# Patient Record
Sex: FEMALE | Race: WHITE | NOT HISPANIC OR LATINO | Employment: UNEMPLOYED | URBAN - METROPOLITAN AREA
[De-identification: names, ages, dates, MRNs, and addresses within clinical notes are randomized per-mention and may not be internally consistent; named-entity substitution may affect disease eponyms.]

---

## 2023-12-11 ENCOUNTER — OFFICE VISIT (OUTPATIENT)
Dept: DERMATOLOGY | Facility: CLINIC | Age: 43
End: 2023-12-11
Payer: COMMERCIAL

## 2023-12-11 DIAGNOSIS — D22.9 MULTIPLE BENIGN NEVI: Primary | ICD-10-CM

## 2023-12-11 PROCEDURE — 99213 OFFICE O/P EST LOW 20 MIN: CPT | Performed by: STUDENT IN AN ORGANIZED HEALTH CARE EDUCATION/TRAINING PROGRAM

## 2023-12-11 RX ORDER — GLUCOSAM/CHONDRO/HERB 149/HYAL 750-100 MG
TABLET ORAL
COMMUNITY

## 2023-12-11 RX ORDER — VIT C/E/ZN/COPPR/LUTEIN/ZEAXAN 250MG-90MG
CAPSULE ORAL
COMMUNITY

## 2023-12-11 NOTE — PROGRESS NOTES
Subjective     Maria De Jesus Merino is a 43 y.o. female who presents for the following: Skin Check (Hx of skin cancer in Family, but no personal Hx.).     Review of Systems:  No other skin or systemic complaints other than what is documented elsewhere in the note.    The following portions of the chart were reviewed this encounter and updated as appropriate:          Skin Cancer History  No skin cancer on file.      Specialty Problems    None       Objective   Well appearing patient in no apparent distress; mood and affect are within normal limits.    A full skin examination was performed. All findings within normal limits unless otherwise noted below.    Assessment/Plan   1. Multiple benign nevi    Exam findings: Benign appearing macules and papules  Plan: monitor for any new or changing nevi. Notify me should this occur.  Over the counter use of sun screen product (30+ SPF with mineral sun screen) recommended  Dermatoscopic exam unremarkable of several nevi including left abdomen  Photo taken of left abdomen for closer monitoring reference for next year      I was present during all key portions of visit including history, exam, discussion/plan and/or procedures and directly supervised our resident during all portions of the visit, follow up care, medications and more    Avtar Horowitz MD

## 2024-02-21 ENCOUNTER — TELEPHONE (OUTPATIENT)
Dept: DERMATOLOGY | Facility: CLINIC | Age: 44
End: 2024-02-21
Payer: COMMERCIAL

## 2024-02-21 NOTE — TELEPHONE ENCOUNTER
Pt needs appt with Dr Horowitz for suspicious lesion on face that she noticed afted her Dec appt ..

## 2024-03-04 ENCOUNTER — OFFICE VISIT (OUTPATIENT)
Dept: DERMATOLOGY | Facility: CLINIC | Age: 44
End: 2024-03-04
Payer: COMMERCIAL

## 2024-03-04 DIAGNOSIS — D48.5 NEOPLASM OF UNCERTAIN BEHAVIOR OF SKIN: Primary | ICD-10-CM

## 2024-03-04 PROCEDURE — 11102 TANGNTL BX SKIN SINGLE LES: CPT | Performed by: STUDENT IN AN ORGANIZED HEALTH CARE EDUCATION/TRAINING PROGRAM

## 2024-03-04 PROCEDURE — 88305 TISSUE EXAM BY PATHOLOGIST: CPT | Performed by: DERMATOLOGY

## 2024-03-04 ASSESSMENT — DERMATOLOGY PATIENT ASSESSMENT
DO YOU USE SUNSCREEN: OCCASIONALLY
FOR PATIENTS COMING IN FOR A FOLLOW-UP VISIT - HAVE THERE BEEN ANY CHANGES IN YOUR HEALTH SINCE YOUR LAST VISIT: SPOT ON NOSE
HAVE YOU HAD OR DO YOU HAVE A STAPH INFECTION: NO
HAVE YOU HAD OR DO YOU HAVE VASCULAR DISEASE: NO
DO YOU USE A TANNING BED: NO
DO YOU HAVE IRREGULAR MENSTRUAL CYCLES: NO
ARE YOU AN ORGAN TRANSPLANT RECIPIENT: NO
DO YOU HAVE ANY NEW OR CHANGING LESIONS: YES
ARE YOU TRYING TO GET PREGNANT: NO
ARE YOU ON BIRTH CONTROL: NO

## 2024-03-04 ASSESSMENT — DERMATOLOGY QUALITY OF LIFE (QOL) ASSESSMENT
RATE HOW EMOTIONALLY BOTHERED YOU ARE BY YOUR SKIN PROBLEM (FOR EXAMPLE, WORRY, EMBARRASSMENT, FRUSTRATION): 0 - NEVER BOTHERED
WHAT SINGLE SKIN CONDITION LISTED BELOW IS THE PATIENT ANSWERING THE QUALITY-OF-LIFE ASSESSMENT QUESTIONS ABOUT: NONE OF THE ABOVE
RATE HOW BOTHERED YOU ARE BY EFFECTS OF YOUR SKIN PROBLEMS ON YOUR ACTIVITIES (EG, GOING OUT, ACCOMPLISHING WHAT YOU WANT, WORK ACTIVITIES OR YOUR RELATIONSHIPS WITH OTHERS): 0 - NEVER BOTHERED
DATE THE QUALITY-OF-LIFE ASSESSMENT WAS COMPLETED: 66903
RATE HOW BOTHERED YOU ARE BY SYMPTOMS OF YOUR SKIN PROBLEM (EG, ITCHING, STINGING BURNING, HURTING OR SKIN IRRITATION): 0 - NEVER BOTHERED
ARE THERE EXCLUSIONS OR EXCEPTIONS FOR THE QUALITY OF LIFE ASSESSMENT: NO

## 2024-03-04 ASSESSMENT — ITCH NUMERIC RATING SCALE: HOW SEVERE IS YOUR ITCHING?: 0

## 2024-03-04 ASSESSMENT — PATIENT GLOBAL ASSESSMENT (PGA): PATIENT GLOBAL ASSESSMENT: PATIENT GLOBAL ASSESSMENT:  1 - CLEAR

## 2024-03-04 NOTE — PROGRESS NOTES
Subjective     Maria De Jesus Merino is a 43 y.o. female who presents for the following: Suspicious Skin Lesion (Left side of nose. No itchiness or pain. No personal hx of skin cancer. Family hx of melanoma (sister)).     Review of Systems:  No other skin or systemic complaints other than what is documented elsewhere in the note.    The following portions of the chart were reviewed this encounter and updated as appropriate:          Skin Cancer History  No skin cancer on file.      Specialty Problems    None       Objective   Well appearing patient in no apparent distress; mood and affect are within normal limits.    A focused skin examination was performed. All findings within normal limits unless otherwise noted below.    Assessment/Plan   1. Neoplasm of uncertain behavior of skin  Left Nasal Sidewall  Pink papule              Lesion biopsy  Type of biopsy: tangential    Informed consent: discussed and consent obtained    Timeout: patient name, date of birth, surgical site, and procedure verified    Procedure prep:  Patient was prepped and draped  Anesthesia: the lesion was anesthetized in a standard fashion    Anesthetic:  1% lidocaine w/ epinephrine 1-100,000 local infiltration  Instrument used: DermaBlade    Hemostasis achieved with: aluminum chloride    Outcome: patient tolerated procedure well    Post-procedure details: sterile dressing applied and wound care instructions given    Dressing type: petrolatum and bandage      Specimen 1 - Dermatopathology- DERM LAB  Differential Diagnosis: bcc?  Check Margins Yes/No?:    Comments:    Dermpath Lab: Routine Histopathology (formalin-fixed tissue)

## 2024-03-06 LAB
LABORATORY COMMENT REPORT: NORMAL
PATH REPORT.FINAL DX SPEC: NORMAL
PATH REPORT.GROSS SPEC: NORMAL
PATH REPORT.MICROSCOPIC SPEC OTHER STN: NORMAL
PATH REPORT.RELEVANT HX SPEC: NORMAL
PATH REPORT.TOTAL CANCER: NORMAL

## 2024-03-11 DIAGNOSIS — C44.311 BASAL CELL CARCINOMA (BCC) OF SKIN OF NOSE: ICD-10-CM

## 2024-03-19 ENCOUNTER — OFFICE VISIT (OUTPATIENT)
Dept: DERMATOLOGY | Facility: CLINIC | Age: 44
End: 2024-03-19
Payer: COMMERCIAL

## 2024-03-19 DIAGNOSIS — C44.311 BASAL CELL CARCINOMA (BCC) OF LEFT SIDE OF NOSE: Primary | ICD-10-CM

## 2024-03-19 PROCEDURE — 17311 MOHS 1 STAGE H/N/HF/G: CPT | Performed by: DERMATOLOGY

## 2024-03-19 PROCEDURE — 17312 MOHS ADDL STAGE: CPT | Performed by: DERMATOLOGY

## 2024-03-19 PROCEDURE — 13151 CMPLX RPR E/N/E/L 1.1-2.5 CM: CPT | Performed by: DERMATOLOGY

## 2024-03-19 PROCEDURE — 99214 OFFICE O/P EST MOD 30 MIN: CPT | Performed by: DERMATOLOGY

## 2024-03-19 NOTE — PROGRESS NOTES
Office Visit Note  Date: 3/19/2024  Surgeon:  Mackenzie Anne MD  Office Location: DO [facility]  [address]  [address]  [facility] [address]  Dept: [phone]  Dept Fax: [phone]  Referring Provider: Avtar Horowitz MD  [id number]  Department of Dermatology  [address]   Maria De Jesus Merino is a 43 y.o. female who presents for the following: MOHS Surgery    According to the patient, the lesion has been present for approximately 1 month at the time of diagnosis.  The lesion is not causing symptoms.  The lesion has not been treated previously.    The patient does not have a pacemaker / defibrillator.  The patient does not have a heart valve / joint replacement.    The patient is not on blood thinners.  The patient does not have a history of hepatitis B or C.  The patient does not have a history of HIV.  The patient does not have a history of immunosuppression (e.g. organ transplantation, malignancy, medications)    Review of Systems:  No other skin or systemic complaints other than what is documented elsewhere in the note.    MEDICAL HISTORY: clinically relevant history including significant past medical history, medications and allergies was reviewed and documented in Epic.    Objective   Well appearing patient in no apparent distress; mood and affect are within normal limits.  Vital signs: See record.  Noted on the Left Malar Cheek  Is a 0.6 x 0.6 cm scar      The patient confirmed the identified site.    Discussion:  The nature of the diagnosis was explained. The lesion is a skin cancer.  It has a risk of local growth and distant spread. The condition is associated with sun exposure.  Warning signs of non-melanoma skin cancer discussed. Patient was instructed to perform monthly self skin examination.  We recommended that the patient have regular full skin exams given an increased risk of subsequent skin cancers. The patient was instructed to use sun  protective behaviors including use of broad spectrum sunscreens and sun protective clothing to reduce risk of skin cancers.      Risks, benefits, side effects of Mohs surgery were discussed with patient and the patient voiced understanding.  It was explained that even though the cure rate of Mohs is very high it is not 100%. Risks of surgery including but not limited to bleeding, infection, numbness, nerve damage, and scar were reviewed.  Discussion included wound care requirements, activity restrictions, likely scar outcome and time to heal.     After Mohs surgery, the defect may need to be repaired surgically and the scar may be longer than the original lesion.  Reconstruction options, risks, and benefits were reviewed including second intention healing, linear repair (4-1 ratio was explained), local flaps, skin grafts, cartilage grafts and interpolation flaps (the need for multiple surgeries was explained). Possible outcomes were reviewed including likely scar appearance, failure of flap survival, infection, bleeding and the need for revision surgery.     The pathology was reviewed, the photograph was reviewed, and the referring physician's note was reviewed.    Patient elected for Mohs surgery.

## 2024-03-19 NOTE — PROGRESS NOTES
Mohs Surgery Operative Note    Date of Surgery:  3/19/2024  Surgeon:  Mackenzie Anne MD  Office Location:  [address] [facility]  [address] [facility] [address]  [facility] [address]  Dept: [phone]  Dept Fax: [phone]  Referring Provider: Avtar Horowitz MD  [id number]  [place] [address]      Assessment/Plan   Pre-procedure:   Obtained informed consent: written from patient  The surgical site was identified and confirmed with the patient.     Intra-operative:   Audible time out called at : 10:05 AM 03/19/24  by: Helen Huerta MA   Verified patient name, birthdate, site, specimen bottle label & requisition.    The planned procedure(s) was again reviewed with the patient. The risks of bleeding, infection, nerve damage and scarring were reviewed. Written authorization was obtained. The patient identity, surgical site, and planned procedure(s) were verified. The provider acted as both surgeon and pathologist.     Basal cell carcinoma (BCC) of left side of nose  Left Nasal Sidewall  Mohs surgery  Consent obtained: written    Universal Protocol:  Procedure explained and questions answered to patient or proxy's satisfaction: Yes    Test results available and properly labeled: Yes    Pathology report reviewed: Yes    External notes reviewed: Yes    Photo or diagram used for site identification: Yes    Site/side marked: Yes    Slide independently reviewed by Mohs surgeon: Yes    Immediately prior to procedure a time out was called: Yes    Patient identity confirmed: verbally with patient  Preparation: Patient was prepped and draped in usual sterile fashion      Anticoagulation:  Is the patient taking prescription anticoagulant and/or aspirin prescribed/recommended by a physician? No    Was the anticoagulation regimen changed prior to Mohs? No      Anesthesia:  Anesthesia method: local infiltration  Local anesthetic: lidocaine 2% WITH epi    Procedure  Details:  Biopsy accession number: [id number]  Date of biopsy: 3/4/2024  Pre-Op diagnosis: basal cell carcinoma  BCC subtype: nodular  Surgery side: left  Surgical site (from skin exam): Left Nasal Sidewall  Pre-operative length (cm): 0.6  Pre-operative width (cm): 0.6  Indications for Mohs surgery: anatomic location where tissue conservation is critical  Previously treated? No      Micrographic Surgery Details:  Post-operative length (cm): 0.7  Post-operative width (cm): 1  Number of Mohs stages: 2    Stage 1  Comments: The patient was brought into the operating room and placed in the procedure chair in the appropriate position.  The area positive by previous biopsy was identified and confirmed with the patient. The area of clinically obvious tumor was debulked using a curette and/or scalpel as needed. An incision was made following the Mohs approach through the skin. The specimen was taken to the lab, divided into 2 piece(s) and appropriately chromacoded and processed.  Tumor was seen on the lateral margins as indicated on the on the Mohs map.  Nodular basal cell carcinoma. Histologic examination revealed large tumor aggregates of atypical basaloid cells with peripheral palisading and tumoral clefting.   Tumor features identified on Mohs section: basal carcinoma   Depth of invasion: dermis    Stage 2     Comments: The area of positivity as noted on the Mohs map in the previous stage was identified and removed using the Mohs technique. The specimen was taken to the lab and appropriately chromacoded and processed in 1 piece(s).  Tumor features identified on Mohs section: no tumor identified  Depth of defect: subcutaneous fat    Patient tolerance of procedure: tolerated well, no immediate complications    Reconstruction:  Was the defect reconstructed? Yes    Was reconstruction performed by the same Mohs surgeon? Yes    Setting of reconstruction: outpatient office  When was reconstruction performed? same day  Type of  reconstruction: linear  Linear reconstruction: complex  Subcutaneous Layers (Deep Stitches)   Suture size:  5-0  Suture type:  Monocryl  Stitches:  Buried vertical mattress  Fine/surface layer approximation (top stitches)   Epidermal/Superficial suture size:  5-0  Epidermal/Superficial suture type:  Fast-absorbing gut  Stitches: simple running    Hemostasis achieved with: electrodesiccation  Outcome: patient tolerated procedure well with no complications    Post-procedure details: sterile dressing applied and wound care instructions given    Dressing type: petrolatum, pressure dressing, Telfa pad and Hypafix      Complex Linear Repair - Wide Undermining:  Given the location and size of the defect, it was determined that a complex layered linear closure was required to restore normal anatomy and function. The repair was considered complex because extensive undermining was required and performed. The amount of undermining performed was greater than the maximum width of the defect as measured perpendicular to the closure line along at least one entire edge of the defect. Standing cutaneous cones were removed using Burow's triangles. The wound edges were brought into close approximation with buried vertical mattress sutures. The remainder of the wound was then closed with epidermal top sutures.    The final repair measured 1.5 cm          Wound care was discussed, and the patient was given written post-operative wound care instructions.      The patient will follow up with Mackenzie Anne MD as needed for any post operative problems or concerns, and will follow up with their primary dermatologist as scheduled.

## 2024-04-03 ENCOUNTER — APPOINTMENT (OUTPATIENT)
Dept: OBSTETRICS AND GYNECOLOGY | Facility: CLINIC | Age: 44
End: 2024-04-03
Payer: COMMERCIAL

## 2024-04-12 ENCOUNTER — APPOINTMENT (OUTPATIENT)
Dept: DERMATOLOGY | Facility: CLINIC | Age: 44
End: 2024-04-12
Payer: COMMERCIAL

## 2024-06-25 ENCOUNTER — APPOINTMENT (OUTPATIENT)
Dept: DERMATOLOGY | Facility: CLINIC | Age: 44
End: 2024-06-25
Payer: COMMERCIAL

## 2024-07-15 ENCOUNTER — OFFICE VISIT (OUTPATIENT)
Dept: SLEEP MEDICINE | Facility: CLINIC | Age: 44
End: 2024-07-15
Payer: COMMERCIAL

## 2024-07-15 VITALS
HEIGHT: 64 IN | HEART RATE: 80 BPM | BODY MASS INDEX: 22.77 KG/M2 | TEMPERATURE: 98.4 F | WEIGHT: 133.4 LBS | SYSTOLIC BLOOD PRESSURE: 121 MMHG | DIASTOLIC BLOOD PRESSURE: 80 MMHG

## 2024-07-15 DIAGNOSIS — G47.00 INSOMNIA, UNSPECIFIED TYPE: ICD-10-CM

## 2024-07-15 DIAGNOSIS — Z72.821 INADEQUATE SLEEP HYGIENE: ICD-10-CM

## 2024-07-15 DIAGNOSIS — G47.33 OSA (OBSTRUCTIVE SLEEP APNEA): Primary | ICD-10-CM

## 2024-07-15 PROCEDURE — G2211 COMPLEX E/M VISIT ADD ON: HCPCS | Performed by: STUDENT IN AN ORGANIZED HEALTH CARE EDUCATION/TRAINING PROGRAM

## 2024-07-15 PROCEDURE — 1036F TOBACCO NON-USER: CPT | Performed by: STUDENT IN AN ORGANIZED HEALTH CARE EDUCATION/TRAINING PROGRAM

## 2024-07-15 PROCEDURE — 99204 OFFICE O/P NEW MOD 45 MIN: CPT | Performed by: STUDENT IN AN ORGANIZED HEALTH CARE EDUCATION/TRAINING PROGRAM

## 2024-07-15 ASSESSMENT — SLEEP AND FATIGUE QUESTIONNAIRES
HOW LIKELY ARE YOU TO NOD OFF OR FALL ASLEEP WHILE LYING DOWN TO REST IN THE AFTERNOON WHEN CIRCUMSTANCES PERMIT: HIGH CHANCE OF DOZING
HOW LIKELY ARE YOU TO NOD OFF OR FALL ASLEEP WHILE SITTING AND TALKING TO SOMEONE: WOULD NEVER DOZE
SITING INACTIVE IN A PUBLIC PLACE LIKE A CLASS ROOM OR A MOVIE THEATER: WOULD NEVER DOZE
ESS-CHAD TOTAL SCORE: 6
DIFFICULTY_STAYING_ASLEEP: MODERATE
HOW LIKELY ARE YOU TO NOD OFF OR FALL ASLEEP WHEN YOU ARE A PASSENGER IN A CAR FOR AN HOUR WITHOUT A BREAK: MODERATE CHANCE OF DOZING
HOW LIKELY ARE YOU TO NOD OFF OR FALL ASLEEP WHILE SITTING AND READING: SLIGHT CHANCE OF DOZING
WORRIED_DISTRESSED_DUE_TO_SLEEP: MUCH
SLEEP_PROBLEM_NOTICEABLE_TO_OTHERS: VERY MUCH NOTICEABLE
SLEEP_PROBLEM_INTERFERES_DAILY_ACTIVITIES: MUCH
SATISFACTION_WITH_CURRENT_SLEEP_PATTERN: DISSATISFIED
HOW LIKELY ARE YOU TO NOD OFF OR FALL ASLEEP IN A CAR, WHILE STOPPED FOR A FEW MINUTES IN TRAFFIC: WOULD NEVER DOZE
HOW LIKELY ARE YOU TO NOD OFF OR FALL ASLEEP WHILE WATCHING TV: WOULD NEVER DOZE
HOW LIKELY ARE YOU TO NOD OFF OR FALL ASLEEP WHILE SITTING QUIETLY AFTER LUNCH WITHOUT ALCOHOL: WOULD NEVER DOZE

## 2024-07-15 ASSESSMENT — ENCOUNTER SYMPTOMS
NEUROLOGICAL NEGATIVE: 1
CARDIOVASCULAR NEGATIVE: 1
RESPIRATORY NEGATIVE: 1
PSYCHIATRIC NEGATIVE: 1
CONSTITUTIONAL NEGATIVE: 1

## 2024-07-15 NOTE — PROGRESS NOTES
Patient: Maria De Jesus Merino    [id number]  : 1980 -- AGE 43 y.o.    Provider: Yfn Malloy MD     Location [facility]   Service Date: 7/15/2024              [facility]  New Visit Note      HISTORY OF PRESENT ILLNESS     The patient's referring provider is: Katarina Velickovic Abadie, MD    HISTORY OF PRESENT ILLNESS   Maria De Jesus Merino is a 43 y.o. female with h/o CHETAN and Insomnia who presents to a [facility] for a sleep medicine evaluation with concerns of New Patient Visit.     Past Sleep History  Patient has the following sleep-related diagnoses: obstructive sleep apnea. 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  She reports that she has a very strong family history of CHETAN (mom, 2 of her siblings).  She also suffer from insomnia (maintenance issue) quite severely early this year.  This has improved since its worst periods.  She has been working with [facility] and was thinking to get herself an oral appliance.  She also went to a ?dentist who suggested to fit her with some sort of oral appliance that will help with her TMJ and then with her CHETAN.  She had some preliminary imaging done and brought them to the visit today as well.  She also wears a [employer] watch which records her sleep and is not giving her good scores compared to her .  She is open to suggestions to all the available options.  Her insomnia was at its worst about 3 months for about 2 months period    Sleep schedule:      Weekdays / Work Days Weekends / Days Off   Bedtime 10 pm  same as weekdays   Sleep latency 30 min same as weekdays   Wake time 6:30 am  same as weekdays   Total sleep time  Average/day:  Total sleep time 7.5 hours/day Same as weekdays   Naps Rarely (1 a month)    Nocturnal awakenings Yes, about 1-2 times a night     Preferred sleeping position: SLEEP POSITION: sidelying    Sleep-related ROS:    Sleep Initiation: no problems going to sleep  Problems going to sleep associated with: No problems going to sleep    Sleep Maintenance: wakes up about 1-2 times per night, easily returns to sleep after awakening, and prolonged awakenings  Problems staying asleep associated with: Problems Staying Asleep: no clear reason    Breathing during sleep: snoring    RLS screen:  RLSSCREEN: - Sensations: Patient does not have unusual sensations in their extremities that cause an urge to move them     Daytime Symptoms  On awakening patient reports: morning headaches, morning dry mouth, and morning sore throat    Patient reports DAYTIME SYMPTOMS: difficulty with memory or concentration during the day  Patient denies daytime symptoms including: Denies: feeling sleepy when driving  Fatigue: symptoms bothersome, but easily able to carry out all usual work/school/family activities    ESS: 6  NELSON: 15  FOSQ: 29      REVIEW OF SYSTEMS     REVIEW OF SYSTEMS  Review of Systems   Constitutional: Negative.    HENT: Negative.     Respiratory: Negative.     Cardiovascular: Negative.    Genitourinary: Negative.    Skin: Negative.    Neurological: Negative.    Psychiatric/Behavioral: Negative.       SLEEP ROS: snoring      ALLERGIES AND MEDICATIONS     ALLERGIES  No Known Allergies    MEDICATIONS  Current Outpatient Medications   Medication Sig Dispense Refill    cholecalciferol (Vitamin D-3) 25 MCG (1000 UT) capsule Take by mouth.      omega 3-dha-epa-fish oil (Fish OiL) 1,000 mg (120 mg-180 mg) capsule Take by mouth.       No current facility-administered medications for this visit.         PAST HISTORY     PAST MEDICAL HISTORY  She  has no past medical history on file.    PAST SURGICAL HISTORY:  History reviewed. No pertinent surgical history.    FAMILY HISTORY  No family history on file.    She does have a  "family history of sleep disorder.    SOCIAL HISTORY  She  reports that she has never smoked. She has never used smokeless tobacco. No history on file for alcohol use and drug use. She currently lives with her .     Caffeine consumption: Yes, 1 cups/day  Alcohol consumption: Yes, sometimes  Smoking: No  Marijuana: No      PHYSICAL EXAM     VITAL SIGNS: /80 (BP Location: Right arm, Patient Position: Sitting, BP Cuff Size: Adult)   Pulse 80   Temp 36.9 °C (98.4 °F) (Temporal)   Ht 1.626 m (5' 4\")   Wt 60.5 kg (133 lb 6.4 oz)   BMI 22.90 kg/m²      CURRENT WEIGHT:   Vitals:    07/15/24 1442   Weight: 60.5 kg (133 lb 6.4 oz)     Body mass index is 22.9 kg/m².  PREVIOUS WEIGHTS:  Wt Readings from Last 3 Encounters:   07/15/24 60.5 kg (133 lb 6.4 oz)       Physical Exam  Vitals reviewed.   Constitutional:       General: She is not in acute distress.     Appearance: Normal appearance. She is well-developed and normal weight.   HENT:      Head: Normocephalic and atraumatic.      Nose: Nose normal. No congestion or rhinorrhea.      Mouth/Throat:      Mouth: Mucous membranes are moist.      Pharynx: Oropharynx is clear. No oropharyngeal exudate.   Eyes:      General: No scleral icterus.     Extraocular Movements: Extraocular movements intact.      Conjunctiva/sclera: Conjunctivae normal.      Pupils: Pupils are equal, round, and reactive to light.   Neck:      Thyroid: No thyroid mass or thyroid tenderness.      Vascular: No JVD.   Cardiovascular:      Rate and Rhythm: Normal rate.      Pulses: Normal pulses.   Pulmonary:      Effort: Pulmonary effort is normal. No respiratory distress.   Musculoskeletal:      Cervical back: Normal range of motion and neck supple. No rigidity or tenderness.   Lymphadenopathy:      Cervical: No cervical adenopathy.   Neurological:      Mental Status: She is alert and oriented to person, place, and time.   Psychiatric:         Mood and Affect: Mood normal.         Behavior: " "Behavior normal.         Thought Content: Thought content normal.       PHYSICAL EXAM: MODIFIED MALLAMPATI SCORE: III (soft and hard palate and base of uvula visible)  TONGUE SCALLOPING: with scalloping      RESULTS/DATA     No results found for: \"CO2\", \"IRON\", \"TRANSFERRIN\", \"IRONSAT\", \"TIBC\", \"FERRITIN\"          ASSESSMENT/PLAN     Ms. Merino is a 43 y.o. female and She was referred to the [facility] for evaluation of CHETAN and Insomnia    Problem List, Orders, Assessment, Recommendations:  Problem List Items Addressed This Visit             ICD-10-CM    CHETAN (obstructive sleep apnea) - Primary G47.33     Mild CHETAN based on PSG done at [facility].  Will trial APAP at 4-10 cwp via [facility]  We talked about many other options at length including OA, Bongo, EPAP devices etc  We will start with CPAP first  I also gave her referral information about sleep-dentists and she will look into it           Relevant Orders    Positive Airway Pressure (PAP) Therapy    Insomnia G47.00     Mainly maintenance issue.  It could be sleep apnea related  We will start treatment with PAP and monitor how things go         Inadequate sleep hygiene Z72.821     Sleep restriction idea was introduced and patient is instructed to get out of bed during the middle of the night when she can't fall back to sleep.  To not stay in bed, instead, read in a chair or recliner and wait to go back to bed when she feels drowsy again  Let sleep come to her, don't chun sleep          We spent over 45 minutes going over history, sleep study results, treatment options and plans.  I also suggested her to not wear her Garmain watch to sleep for a week and see how she feels...    Disposition    Return to clinic in 3-4 months           "

## 2024-07-15 NOTE — ASSESSMENT & PLAN NOTE
Mainly maintenance issue.  It could be sleep apnea related  We will start treatment with PAP and monitor how things go

## 2024-07-15 NOTE — PATIENT INSTRUCTIONS
[facility] Sleep Medicine  DO [address] [facility]  [address] [facility] [address] [facility] [address]       NAME: Maria De Jesus Merino   DATE: 07/15/24    Your Sleep Provider Today: Yfn Malloy MD  Your Primary Care Physician: Katarina Velickovic Abadie, MD   Your Referring Provider: No ref. provider found    DIAGNOSIS:   1. CHETAN (obstructive sleep apnea)  Positive Airway Pressure (PAP) Therapy          Thank you for coming to the Sleep Medicine Clinic today! Your sleep medicine provider today was: Yfn Malloy MD Below is a summary of your treatment plan, other important information, and our contact numbers:      TREATMENT PLAN     - Follow-up in 3-4 months.  - If not already done, sign up for 'My Chart' and send prescription requests or messages through this      Obstructive sleep apnea (CHETAN): CHETAN is a sleep disorder where your upper airway muscles relax during sleep and the airway intermittently and repetitively narrows and collapses leading to blocked airway (apnea) which, in turn, can disrupt breathing in sleep, lower oxygen levels while you sleep and cause night time wakings. Because apnea may cause higher carbon dioxide or low oxygen levels, untreated CHETAN can lead to heart arrhythmia, elevation of blood pressure, and make it harder for the body to consolidate memory and metabolize (leading to higher blood sugars at night).   Frequent partial arousals occur during sleep resulting in sleep deprivation and daytime sleepiness. CHETAN is associated with an increased risk of cardiovascular disease, stroke, hypertension, and insulin resistance. Moreover, untreated CHETAN with excessive daytime sleepiness can increase the risk of motor vehicular accidents.    Some conservative strategies for CHETAN are:   Positional therapy - Avoid sleeping on your back.   Healthy diet, exercise, and optimizing weight encouraged.   Avoid alcohol late in the evening as it can make sleep apnea worse.     Safety: Avoid driving and  "operating heavy equipment while sleepy. Drowsy driving may lead to life-threatening motor vehicle accidents.     Common treatment options for sleep apnea include weight management, positional therapy, Positive Airway Therapy (PAP) therapy, oral appliance therapy, hypoglossal nerve stimulation, and select airway surgeries.     Insomnia care:  Insomnia, or trouble falling asleep or staying asleep, can be caused by many different things such as untreated sleep apnea, anxiety, depression, stress, poor sleep habits and other medical conditions or medications. The best way to treat insomnia is to treat the cause. In general, we can all benefit from better sleep hygiene. Some recommendations to help you improve your sleep hygiene so you can fall asleep, stay asleep, and wake up felling refreshed include:    Keep a routine bedtime and rise time.  Avoid caffeine in the late afternoon and early evening, including chocolate.   Keep your bedroom technology free. Avoid TV and electronics one hour prior to bedtime. Don't have a clock visible in your room.  Set up a 'worry\" time in the late afternoon to cope with her worries and plan for the following day.  Avoid naps. If necessary, keep naps to under 15-20 minutes.  Develop a relaxing routine before bed.  Keep the bedroom for sleeping and intimacy only.  Make your bedroom dark, quiet, and comfortable temperature.  Do not exercise right before bed. Do get 20-30 minutes of exercise during your day.   Do not stay in bed for more than 30 minutes if you cannot sleep. Leave your bedroom and find a dull activity to do until you feel you could sleep. Then return to your bed.   Don't sleep with your pet.   A light bedtime snack such as a glass of milk and banana can promote sleep.    You have been recommended to Cognitive Behavioral Therapy for Insomnia (CBT-I). CBT-I is widely recognized as an effective treatment for a wide range of insomnias. The treatment is typically made up of a " "number of components including assessment, behavioral and cognitive interventions, motivational techniques and relapse prevention skills. An overwhelming amount of evidence suggests that CBT-I is as effective as hypnotics and the newer non-benzodiazepine sleep aides in the acute treatment and is more effective than medication in the long term treatment of insomnia.     CBT-I at  - Lucero [place], NP  GoToSleep- online course via [facility]. Self-guided. One time charge to sign up: Branden  SleepEZ- Free self-guided course from the [facility] https://www.veterantraining.va.gov/insomnia/  Dr. Phipps - one on one CBT-I service www.[employer]       Starting Positive Airway Pressure: You were ordered a device to wear when you sleep called PAP (Positive Airway Pressure) to treat your sleep apnea. The order will be submitted to a durable medical equipment company who will arrange setting you up with the device. They will provide all the necessary equipment and discuss use and maintenance of the device with you.     Please followup with us in 1-2 months of starting PAP to see how well it is working for you or to troubleshoot. Please bring your equipment to this initial followup visit.    **Please bring all PAP equipment with you to follow up appointments unless told otherwise.**     Important things to keep in mind as you start PAP:  Insurance will monitor your usage during the first 90 days.  You should use your PAP - \"all night, every night\", for your health. The bare minimum is to use your PAP device while sleeping = at least 4 hours per day at least 5 days per week. Otherwise, your PAP device may be reclaimed by your PAP vendor at 90 days.  There are many mask to choose from to wear with your PAP machine. If you are not comfortable with the first mask issued to you, call your DME and ask for another option to try. Some have a 30 day return policy.  Discuss with your provider if you are having issues breathing with the machine " or the temperature or humidity feel uncomfortable  Expect to have an adjustment period when you start your device. It helps to continuing wearing the machine every day for a period of time until you get more used to it. You can practice with wearing the mask alone if you need, then add in the PAP air pressure a few days later.   Reach out for help if you are struggling! The sleep medicine department can be reached at [phone]  We encourage you to download data monitoring apps to your phone. For [employer] Air[employer]ense 10/11 - MyAir shane. For [employer] - DreamMapper. Both are available in the Shane store for free and are a great tool to monitor your progress with your CPAP night to night.         Oral Appliance Therapy    Oral appliance therapy is a dental device that can be fabricated by a dental sleep medicine specialist.     After talking about sleep apnea treatment options, we have decided that an oral appliance is a reasonable treatment option. You need to contact a dentist who is comfortable with making oral appliances. You should consider a dental sleep specialist. Below are the names of individuals that we commonly refer patients to or are known dental sleep medicine specialists:    Please schedule an appointment with dentistry to evaluate you for an oral appliance to treat your sleep apnea. Consider the following specialists in the area:     Dr. Lito Ellis  [place], [place]: [phone]    Dr. Florentin Martinez  /[facility]: [phone]    Dr. Sridhar Robbins  [facility]   [phone]    Dr. Jluis Ashley  [place], [place]: [phone]    Dr. Yemi Campbell  [place], [place]: [phone]    Dr. Jeanine [place], [phone]    Dr. Yusuf Maher, [place]: [phone]    Dr. Mark Hook, [place]: [phone]    Dr. Milan Peña, [place]: [phone]    Dr. Harley Perez, [place]: [phone]    [facility]:  Dr. Oscar Roberts    You can also check  the American Academy of Dental Sleep Medicine for additional recommendation of dentists that make appliances at: https://mms.aadsm.org/members/directory/search_bootstrap.php?org_id=ADSM.      Oral appliance therapy is typically covered by your medical insurance as sleep apnea is a medical diagnoses and not a dental diagnosis. You can confirm coverage by calling your medical insurance and providing them with the following medical codes:  Diagnosis code: Obstructive Sleep Apnea G47.33  Procedure code: M58628    After your appliance is made and adjusted, we like to make sure it is treating your sleep apnea effectively. Please return to our clinic at that time for follow up.       Instructions - Common CHETAN Recs: - For your sleep apnea, continue to use your PAP every night and use it whenever you are sleeping.   - Avoid alcohol or sedatives several hours prior to sleeping.   - Get additional supplies for your PAP (e.g., mask, hose, filters) every 3 months or as your insurance allows from your [employer] company. Replacement cushions for your PAP mask can be requested monthly if airseals are an issue.  - Remember to clean your mask, tubings, and water chamber regularly as instructed.  - Avoid driving or operating heavy machinery when drowsy. A person driving while sleepy is five (5) times more likely to have an accident. If you feel sleepy, pull over and take a short power nap (sleep for less than 30 minutes). Otherwise, ask somebody to drive you.    Follow-up Appointment:   3-4 months      IMPORTANT INFORMATION     Call 911 for medical emergencies.  Our offices are generally open from Monday-Friday, 9 am - 5 pm.  If you need to get in touch with me, you may either call me and my team(number is below) or you can use [employer]om[employer].  If a referral for a test, for CPAP, or for another specialist was made, and you have not heard about scheduling this within a week, please call scheduling at [phone] ([phone]).  If you are unable to make  your appointment for clinic or an overnight study, kindly call the office at least 48 hours in advance to cancel and reschedule.  If you are on CPAP, please bring your device's card or the device to each clinic appointment.   There are no supporting services by either the sleep doctors or their staff on weekends and Holidays, or after 5 PM on weekdays.   If you have been asked to come to a sleep study, make sure you bring toiletries, a comfy pillow, and any nighttime medications that you may regularly take. Also be sure to eat dinner before you arrive. We generally do not provide meals.      PRESCRIPTIONS     We require 7 days advanced notice for prescription refills. If we do not receive the request in this time, we cannot guarantee that your medication will be refilled in time.      IMPORTANT PHONE NUMBERS     Sleep Medicine Clinic Fax: [phone]  Appointments (for Adult Sleep Clinic): [phone] - option 2  Appointments (For Sleep Studies): [phone] - option 3  Behavioral Sleep Medicine: [phone]  Sleep Surgery: [phone]  ENT (Otolaryngology): [phone]  Headache Clinic (Neurology): [phone]  Neurology: [phone]  Psychiatry: [phone]  Pulmonary Function Testing (PFT) Center: [phone]  Pulmonary Medicine: [phone]  [email or web address] (DME): [phone]  [employer] (DME): [phone]  [facility] (DME): [phone]      OUR ADULT SLEEP MEDICINE TEAM   Please do not hesitate to call the office or sleep nurse with any questions between appointments:    Adult Sleep Nurses (Elva Brewer, RN and Rekha Meier RN):  For clinical questions and refilling prescriptions: [phone]  Email sleep diaries and other documents at: [email or web address]    Adult Sleep Medicine Secretaries:  Erica Chester (For Christian/Hinkle/Krise/Strohl/Yekarina/Joe):   P: [phone]  F: [phone]  Heidy Garnica (For Ayala/[place]): P:  [phone]  Fax: [phone]  Elizabeth Gonzalez (For Jurcevic/Blank): P: [phone]  F: [phone]  Laurence Hannah (For Iban): P: [phone]  F: [phone]  Maria Teresa Frosttny (For Nargis/Lianne/Zakhary): P: [phone]  F: [phone]  Zaida Smith (For Anne/Black): P: [phone]  F: [phone]     Adult Sleep Medicine Advanced Practice Providers:  Nikhil Bui ([place], [place])  Jessica Abdalla ([facility])  Ritu Goins CNP ([place], [place], [place])  Janell León CNP ([place], [place], [place])  Lucero Ferrera ([place], [place], [place])  Judit Black CNP ([place], [place])        OUR SLEEP TESTING LOCATIONS     Our team will contact you to schedule your sleep study, however, you can contact us as follow:  Main Phone Line (scheduling only): [phone]), option 3  Adult and Pediatric Locations  [facility] (6 years and older): [facility] - 4th floor ([address] [place] [address]) After hours line: [phone]  [facility] (Main campus: All ages): [facility], 6th floor. After hours line: [phone]   [place] (5 years and older; younger considered on case-by-case basis): [address], [address]. Scheduling  After hours line: [phone]   [place] (6 years and older): [address]; [address]; [address]   [place] (6 years and older): [address] [facility], [address]  After hours line: [phone]   [place] (13 years and older) in [place]: [address], 2nd floor  After hours line: [phone]  [facility] [place] (13 year and older): [address] [facility] [address]  After hours line: [phone]     Adult Only Locations:   [place] (18 years and older): [address] [facility], 2nd floor   [place] (18 years and older): [address] [facility]; 4th floor  After hours line: [phone]   [place] (18 years and older) at [place]: [address]   "After hours line: [phone]          CONTACTING YOUR SLEEP MEDICINE PROVIDER     Send a message directly to your provider through \"My Chart\", which is the email service through your  Records Account: [email or web address]   Call [phone] and leave a message. One of the administrative assistants will forward the message to your sleep medicine provider through \"My Chart\" and/or email.     Your sleep medicine provider for this visit was: Yfn Malloy MD    "

## 2024-07-15 NOTE — ASSESSMENT & PLAN NOTE
Sleep restriction idea was introduced and patient is instructed to get out of bed during the middle of the night when she can't fall back to sleep.  To not stay in bed, instead, read in a chair or recliner and wait to go back to bed when she feels drowsy again  Let sleep come to her, don't chun sleep

## 2024-07-15 NOTE — ASSESSMENT & PLAN NOTE
Mild CHETAN based on PSG done at [facility].  Will trial APAP at 4-10 cwp via [facility]  We talked about many other options at length including OA, Bongo, EPAP devices etc  We will start with CPAP first  I also gave her referral information about sleep-dentists and she will look into it

## 2024-08-02 ENCOUNTER — OFFICE VISIT (OUTPATIENT)
Dept: OBSTETRICS AND GYNECOLOGY | Facility: CLINIC | Age: 44
End: 2024-08-02
Payer: COMMERCIAL

## 2024-08-02 ENCOUNTER — APPOINTMENT (OUTPATIENT)
Dept: OBSTETRICS AND GYNECOLOGY | Facility: CLINIC | Age: 44
End: 2024-08-02
Payer: COMMERCIAL

## 2024-08-02 VITALS
SYSTOLIC BLOOD PRESSURE: 126 MMHG | WEIGHT: 133.8 LBS | BODY MASS INDEX: 22.84 KG/M2 | HEIGHT: 64 IN | DIASTOLIC BLOOD PRESSURE: 72 MMHG

## 2024-08-02 DIAGNOSIS — N95.1 PERIMENOPAUSAL: Primary | ICD-10-CM

## 2024-08-02 DIAGNOSIS — Z79.890 MENOPAUSAL SYNDROME ON HORMONE REPLACEMENT THERAPY: ICD-10-CM

## 2024-08-02 DIAGNOSIS — N95.1 MENOPAUSAL SYNDROME ON HORMONE REPLACEMENT THERAPY: ICD-10-CM

## 2024-08-02 PROCEDURE — 3008F BODY MASS INDEX DOCD: CPT | Performed by: NURSE PRACTITIONER

## 2024-08-02 PROCEDURE — 99204 OFFICE O/P NEW MOD 45 MIN: CPT | Performed by: NURSE PRACTITIONER

## 2024-08-02 PROCEDURE — 1036F TOBACCO NON-USER: CPT | Performed by: NURSE PRACTITIONER

## 2024-08-02 RX ORDER — ESTRADIOL 0.5 MG/1
0.5 TABLET ORAL DAILY
Qty: 30 TABLET | Refills: 11 | Status: SHIPPED | OUTPATIENT
Start: 2024-08-02

## 2024-08-02 RX ORDER — PROGESTERONE 200 MG/1
CAPSULE ORAL
Qty: 14 CAPSULE | Refills: 11 | Status: SHIPPED | OUTPATIENT
Start: 2024-08-02

## 2024-08-02 RX ORDER — THYROID 15 MG/1
15 TABLET ORAL DAILY
COMMUNITY

## 2024-08-02 ASSESSMENT — ENCOUNTER SYMPTOMS
ENDOCRINE NEGATIVE: 0
EYES NEGATIVE: 0
ALLERGIC/IMMUNOLOGIC NEGATIVE: 0
CONSTITUTIONAL NEGATIVE: 0
NEUROLOGICAL NEGATIVE: 0
RESPIRATORY NEGATIVE: 0
HEMATOLOGIC/LYMPHATIC NEGATIVE: 0
GASTROINTESTINAL NEGATIVE: 0
CARDIOVASCULAR NEGATIVE: 0
MUSCULOSKELETAL NEGATIVE: 0
PSYCHIATRIC NEGATIVE: 0

## 2024-08-02 ASSESSMENT — PAIN SCALES - GENERAL: PAINLEVEL: 0-NO PAIN

## 2024-08-02 NOTE — PROGRESS NOTES
Subjective   Patient ID: Maria De Jesus Merino is a 43 y.o. female who presents for New Patient Visit (Pt here to discuss hormonal changes/).  HPI  Concerns:   Insomnia for several months; resolved 5/2024  Usually the week leading up to her period she has insomnia but this was a prolonged occurrence  Regular and monthly menses    Depression and HA for several days after her menses    Works with functional medicine at [employer]  Had her labs drawn:  Estradiol 50, drawn 5 days after menses  Fsh normal   DHEA-S normal    Menopausal age: perimenopausal     Any Contraindications to HT: personal h/o breast cancer, estrogen sensitive cancer, dementia, stroke, MI, VTE or inherited high risk for VTE, SCAD: none  h/o congenital heart disease (increased risk of DVT) none    contraception: NFP and condoms, uncertain if she desires another pregnancy  HT: none      VMS: none  Sleep difficulties: yes  Mood changes: h/o SAD  Joint pain: none  Brain fog/difficulty concentrating: yes, long history; feels she may have ADHD    GSM: yes  are you sexually active: yes  dyspareunia: deep thrusting  decrease in desire: occasional   difficulty reaching orgasm:  none  Urinary Incontinence: frequency; bladder prolaspe has an appointment with uro/gyn         Fractures: none     Review of Systems    Objective   Physical Exam    Assessment/Plan   Diagnoses and all orders for this visit:  Perimenopausal  Menopausal syndrome on hormone replacement therapy  -     estradiol (Estrace) 0.5 mg tablet; Take 1 tablet (0.5 mg) by mouth once daily.  -     progesterone (Prometrium) 200 mg capsule; Take one pill by mouth at bedtime for 14 days/month    We discussed risks/benefits of MHT; she would like to try  She inquired about testosterone but I advised against it due to potential birth defects if she were to conceive       ABRAHAM Eagle-CNP 08/02/24 2:13 PM

## 2024-09-20 ENCOUNTER — APPOINTMENT (OUTPATIENT)
Dept: DERMATOLOGY | Facility: CLINIC | Age: 44
End: 2024-09-20
Payer: COMMERCIAL

## 2024-09-20 DIAGNOSIS — D22.9 MULTIPLE BENIGN MELANOCYTIC NEVI: ICD-10-CM

## 2024-09-20 DIAGNOSIS — D18.01 CHERRY ANGIOMA: ICD-10-CM

## 2024-09-20 DIAGNOSIS — Z12.83 SCREENING EXAM FOR SKIN CANCER: ICD-10-CM

## 2024-09-20 DIAGNOSIS — L90.5 SCAR CONDITIONS AND FIBROSIS OF SKIN: Primary | ICD-10-CM

## 2024-09-20 PROCEDURE — 99213 OFFICE O/P EST LOW 20 MIN: CPT | Performed by: STUDENT IN AN ORGANIZED HEALTH CARE EDUCATION/TRAINING PROGRAM

## 2024-09-20 ASSESSMENT — DERMATOLOGY QUALITY OF LIFE (QOL) ASSESSMENT
RATE HOW BOTHERED YOU ARE BY SYMPTOMS OF YOUR SKIN PROBLEM (EG, ITCHING, STINGING BURNING, HURTING OR SKIN IRRITATION): 0 - NEVER BOTHERED
DATE THE QUALITY-OF-LIFE ASSESSMENT WAS COMPLETED: 67103
ARE THERE EXCLUSIONS OR EXCEPTIONS FOR THE QUALITY OF LIFE ASSESSMENT: NO
RATE HOW EMOTIONALLY BOTHERED YOU ARE BY YOUR SKIN PROBLEM (FOR EXAMPLE, WORRY, EMBARRASSMENT, FRUSTRATION): 0 - NEVER BOTHERED
RATE HOW BOTHERED YOU ARE BY EFFECTS OF YOUR SKIN PROBLEMS ON YOUR ACTIVITIES (EG, GOING OUT, ACCOMPLISHING WHAT YOU WANT, WORK ACTIVITIES OR YOUR RELATIONSHIPS WITH OTHERS): 0 - NEVER BOTHERED

## 2024-09-20 ASSESSMENT — ITCH NUMERIC RATING SCALE: HOW SEVERE IS YOUR ITCHING?: 0

## 2024-09-20 ASSESSMENT — DERMATOLOGY PATIENT ASSESSMENT: DO YOU HAVE ANY NEW OR CHANGING LESIONS: NO

## 2024-09-20 ASSESSMENT — PATIENT GLOBAL ASSESSMENT (PGA): PATIENT GLOBAL ASSESSMENT: PATIENT GLOBAL ASSESSMENT:  1 - CLEAR

## 2024-09-20 NOTE — PROGRESS NOTES
Subjective     Maria De Jesus Merino is a 43 y.o. female who presents for the following: Skin Check (Skin exam, hx of BCC).     Review of Systems:  No other skin or systemic complaints other than what is documented elsewhere in the note.    The following portions of the chart were reviewed this encounter and updated as appropriate:         Skin Cancer History  Biopsy Date Type Location Status   3/4/24 BCC Left Nasal Sidewall Refer Mohs/Surgeon  6/26/24       Specialty Problems    None       Objective   Well appearing patient in no apparent distress; mood and affect are within normal limits.    A full examination was performed including scalp, head, eyes, ears, nose, lips, neck, chest, axillae, abdomen, back, buttocks, bilateral upper extremities, bilateral lower extremities, hands, feet, fingers, toes, fingernails, and toenails. All findings within normal limits unless otherwise noted below.    Assessment/Plan   1. Scar conditions and fibrosis of skin  - Well healed scar at prior treatment sites without visual or palpable evidence of recurrence.     - Well healed scar(s) at sites of prior skin cancer and/or dysplastic nevi.  - Sun protective behavior reviewed and encouraged including the use of over-the-counter sunscreen with SPF30+ daily (reapply every 1.5 hours when outdoors), UPF clothing, broad rimmed hats, sunglasses, and avoidance of midday sun. Home skin monitoring encouraged and how to monitor for skin cancer (changing or new moles, new rapidly growing or non-healing lesions) reviewed. Patient encouraged to call with interval concerns or changes.       2. Cherry angioma  - scattered small bright red papules and macules    Cherry angioma(s)  - Discussed benign nature and that no treatment is necessary unless it becomes painful or increases in size. Patient opts for clinical monitoring at this time.      3. Multiple benign melanocytic nevi  - scattered regular brown macules and papules              Benign melanocytic  nevi  - Discussed benign nature and that no treatment is necessary unless it becomes painful or increases in size. Patient opts for clinical monitoring at this time.    - Sun protective behavior reviewed and encouraged including the use of over-the-counter sunscreen with SPF30+ daily (reapply every 1.5 hours when outdoors), UPF clothing, broad rimmed hats, sunglasses, and avoidance of midday sun. Home skin monitoring encouraged and how to monitor for skin cancer (changing or new moles, new rapidly growing or non-healing lesions) reviewed. Patient encouraged to call with interval concerns or changes.    -photo taken today of mole on abdomen    4. Screening exam for skin cancer    Related Procedures  Follow Up In Dermatology - Established Patient      Return to clinic in 6 months for full body skin check.    Dalila Carranza MD  Department of Dermatology  I was present during all key portions of visit including history, exam, discussion/plan and/or procedures and directly supervised our resident during all portions of the visit, follow up care, medications and more    Avtar Horowitz MD

## 2024-10-22 ENCOUNTER — APPOINTMENT (OUTPATIENT)
Dept: OBSTETRICS AND GYNECOLOGY | Facility: CLINIC | Age: 44
End: 2024-10-22
Payer: COMMERCIAL

## 2024-11-18 ENCOUNTER — APPOINTMENT (OUTPATIENT)
Dept: SLEEP MEDICINE | Facility: CLINIC | Age: 44
End: 2024-11-18
Payer: COMMERCIAL

## 2024-11-18 VITALS
BODY MASS INDEX: 23.73 KG/M2 | WEIGHT: 139 LBS | HEART RATE: 69 BPM | HEIGHT: 64 IN | TEMPERATURE: 98.2 F | DIASTOLIC BLOOD PRESSURE: 76 MMHG | SYSTOLIC BLOOD PRESSURE: 131 MMHG

## 2024-11-18 DIAGNOSIS — Z72.821 INADEQUATE SLEEP HYGIENE: ICD-10-CM

## 2024-11-18 DIAGNOSIS — G47.33 OSA (OBSTRUCTIVE SLEEP APNEA): Primary | ICD-10-CM

## 2024-11-18 PROBLEM — G47.00 INSOMNIA: Status: RESOLVED | Noted: 2024-07-15 | Resolved: 2024-11-18

## 2024-11-18 PROCEDURE — 3008F BODY MASS INDEX DOCD: CPT | Performed by: STUDENT IN AN ORGANIZED HEALTH CARE EDUCATION/TRAINING PROGRAM

## 2024-11-18 PROCEDURE — 99214 OFFICE O/P EST MOD 30 MIN: CPT | Performed by: STUDENT IN AN ORGANIZED HEALTH CARE EDUCATION/TRAINING PROGRAM

## 2024-11-18 PROCEDURE — 1036F TOBACCO NON-USER: CPT | Performed by: STUDENT IN AN ORGANIZED HEALTH CARE EDUCATION/TRAINING PROGRAM

## 2024-11-18 ASSESSMENT — ENCOUNTER SYMPTOMS
RESPIRATORY NEGATIVE: 1
CONSTITUTIONAL NEGATIVE: 1
PSYCHIATRIC NEGATIVE: 1
NEUROLOGICAL NEGATIVE: 1
CARDIOVASCULAR NEGATIVE: 1

## 2024-11-18 ASSESSMENT — SLEEP AND FATIGUE QUESTIONNAIRES
DIFFICULTY_STAYING_ASLEEP: MILD
HOW LIKELY ARE YOU TO NOD OFF OR FALL ASLEEP WHILE WATCHING TV: WOULD NEVER DOZE
HOW LIKELY ARE YOU TO NOD OFF OR FALL ASLEEP WHILE LYING DOWN TO REST IN THE AFTERNOON WHEN CIRCUMSTANCES PERMIT: HIGH CHANCE OF DOZING
SATISFACTION_WITH_CURRENT_SLEEP_PATTERN: VERY SATISFIED
HOW LIKELY ARE YOU TO NOD OFF OR FALL ASLEEP WHILE SITTING QUIETLY AFTER LUNCH WITHOUT ALCOHOL: WOULD NEVER DOZE
HOW LIKELY ARE YOU TO NOD OFF OR FALL ASLEEP WHILE SITTING AND READING: SLIGHT CHANCE OF DOZING
SITING INACTIVE IN A PUBLIC PLACE LIKE A CLASS ROOM OR A MOVIE THEATER: WOULD NEVER DOZE
WAKING_TOO_EARLY: MILD
WORRIED_DISTRESSED_DUE_TO_SLEEP: A LITTLE
SLEEP_PROBLEM_INTERFERES_DAILY_ACTIVITIES: A LITTLE
HOW LIKELY ARE YOU TO NOD OFF OR FALL ASLEEP WHEN YOU ARE A PASSENGER IN A CAR FOR AN HOUR WITHOUT A BREAK: SLIGHT CHANCE OF DOZING
SLEEP_PROBLEM_NOTICEABLE_TO_OTHERS: A LITTLE
HOW LIKELY ARE YOU TO NOD OFF OR FALL ASLEEP IN A CAR, WHILE STOPPED FOR A FEW MINUTES IN TRAFFIC: WOULD NEVER DOZE
ESS-CHAD TOTAL SCORE: 5
HOW LIKELY ARE YOU TO NOD OFF OR FALL ASLEEP WHILE SITTING AND TALKING TO SOMEONE: WOULD NEVER DOZE

## 2024-11-18 NOTE — ASSESSMENT & PLAN NOTE
Much better now, and it seems like her insomnia was more related to this underlying anxiety she has for her daughter's health situation

## 2024-11-18 NOTE — PROGRESS NOTES
Patient: Maria De Jesus Merino    [id number]  : 1980 -- AGE 44 y.o.    Provider: Yfn Malloy MD     Location [facility]   Service Date: 2024              [facility]  Followup Visit Note        ASSESSMENT/PLAN     Ms. Merino is a 44 y.o. female and she returns in followup to the [facility] for the problems listed below on 24     Problem List, Orders, Assessment, Recommendations:  Problem List Items Addressed This Visit             ICD-10-CM    CHETAN (obstructive sleep apnea) - Primary G47.33     Great compliance and seems like her maintenance issue is also a lot better.  However, she is not sure if CPAP is really the main contributor except that her TMJ is a whole lot better    We had a long discussion about various options such as nasal surgery vs OA vs continuing CPAP vs CPAP holiday.  She is happy with these options and will begin her quest to weigh the difference and effects of different combinations.  She will message me for any questions    If she were to stay on CPAP, she would see me once a year.         Relevant Orders    Follow Up In Adult Sleep Medicine    Positive Airway Pressure (PAP) Therapy    Inadequate sleep hygiene Z72.821     Much better now, and it seems like her insomnia was more related to this underlying anxiety she has for her daughter's health situation            Disposition  Return to clinic in 12 months       HISTORY OF PRESENT ILLNESS     HISTORY OF PRESENT ILLNESS   Maria De Jesus Merino is a 44 y.o. female with h/o CHETAN who presents to a [facility] for followup.     Assessment and plan from last visit: 7/15/2024    Ms. Merino is a 43 y.o. female and She was referred to the [facility] for evaluation of CHETAN and Insomnia     Problem List, Orders, Assessment, Recommendations:  Problem List Items Addressed This Visit               ICD-10-CM     CHETAN  (obstructive sleep apnea) - Primary G47.33       Mild CHETAN based on PSG done at [facility].  Will trial APAP at 4-10 cwp via [facility]  We talked about many other options at length including OA, Bongo, EPAP devices etc  We will start with CPAP first  I also gave her referral information about sleep-dentists and she will look into it              Relevant Orders     Positive Airway Pressure (PAP) Therapy     Insomnia G47.00       Mainly maintenance issue.  It could be sleep apnea related  We will start treatment with PAP and monitor how things go           Inadequate sleep hygiene Z72.821       Sleep restriction idea was introduced and patient is instructed to get out of bed during the middle of the night when she can't fall back to sleep.  To not stay in bed, instead, read in a chair or recliner and wait to go back to bed when she feels drowsy again  Let sleep come to her, don't chun sleep            We spent over 45 minutes going over history, sleep study results, treatment options and plans.  I also suggested her to not wear her Garmain watch to sleep for a week and see how she feels...       Current History    On today's visit, the patient reports that it seems like her maintenance issue is also a lot better.  However, she is not sure if CPAP is really the main contributor except that her TMJ is a whole lot better    We had a long discussion about various options such as nasal surgery vs OA vs continuing CPAP vs CPAP holiday.  She is happy with these options and will begin her quest to weigh the difference and effects of different combinations.  She will message me for any questions    PAP Info  [employer]: [employer]  Issues with therapy: ISSUES WITH THERAPY: None  Benefits with PAP: TMJ a lot better    PAP Adherence  A PAP adherence download was obtained and data was reviewed personally today in clinic.    RLS Followup:   none.    Daytime Symptoms    Patient reports DAYTIME SYMPTOMS: no  "daytime symptoms  Patient denies daytime symptoms including: Denies: excessive daytime sleepiness    Naps: No  Fatigue: denies feeling fatigue    ESS: 5  NELSON: 5  FOSQ: 32    REVIEW OF SYSTEMS     REVIEW OF SYSTEMS  Review of Systems   Constitutional: Negative.    HENT: Negative.     Respiratory: Negative.     Cardiovascular: Negative.    Genitourinary: Negative.    Skin: Negative.    Neurological: Negative.    Psychiatric/Behavioral: Negative.           ALLERGIES AND MEDICATIONS     ALLERGIES  No Known Allergies    MEDICATIONS: She has a current medication list which includes the following prescription(s): cholecalciferol - Take by mouth, estradiol - TAKE 1 TABLET BY MOUTH ONCE DAILY, omega 3-dha-epa-fish oil - Take by mouth, prenatal 2/iron/folic acid/om3 - Take 1 tablet by mouth once daily, progesterone - Take one pill by mouth at bedtime for 14 days/month, and thyroid (pork) - Take 1 tablet (15 mg) by mouth once daily.    PAST MEDICAL HISTORY : She  has no past medical history on file.    PAST SURGICAL HISTORY: She  has no past surgical history on file.     FAMILY HISTORY: No changes since previous visit. Otherwise non-contributory as charted.     SOCIAL HISTORY  She  reports that she has never smoked. She has never used smokeless tobacco. She reports that she does not currently use alcohol. No history on file for drug use.       PHYSICAL EXAM     VITAL SIGNS: /76 (BP Location: Right arm, Patient Position: Sitting, BP Cuff Size: Adult)   Pulse 69   Temp 36.8 °C (98.2 °F) (Oral)   Ht 1.626 m (5' 4\")   Wt 63 kg (139 lb)   BMI 23.86 kg/m²      PREVIOUS WEIGHTS:  Wt Readings from Last 3 Encounters:   11/18/24 63 kg (139 lb)   08/02/24 60.7 kg (133 lb 12.8 oz)   07/15/24 60.5 kg (133 lb 6.4 oz)         RESULTS/DATA     No results found for: \"CO2\", \"IRON\", \"TRANSFERRIN\", \"IRONSAT\", \"TIBC\", \"FERRITIN\"              "

## 2024-11-18 NOTE — ASSESSMENT & PLAN NOTE
Great compliance and seems like her maintenance issue is also a lot better.  However, she is not sure if CPAP is really the main contributor except that her TMJ is a whole lot better    We had a long discussion about various options such as nasal surgery vs OA vs continuing CPAP vs CPAP holiday.  She is happy with these options and will begin her quest to weigh the difference and effects of different combinations.  She will message me for any questions    If she were to stay on CPAP, she would see me once a year.

## 2025-07-08 ENCOUNTER — APPOINTMENT (OUTPATIENT)
Dept: OBSTETRICS AND GYNECOLOGY | Facility: CLINIC | Age: 45
End: 2025-07-08
Payer: COMMERCIAL

## 2025-07-16 ENCOUNTER — APPOINTMENT (OUTPATIENT)
Dept: OBSTETRICS AND GYNECOLOGY | Facility: CLINIC | Age: 45
End: 2025-07-16
Payer: COMMERCIAL

## 2025-07-16 VITALS
SYSTOLIC BLOOD PRESSURE: 122 MMHG | BODY MASS INDEX: 24.04 KG/M2 | WEIGHT: 140.8 LBS | DIASTOLIC BLOOD PRESSURE: 72 MMHG | HEIGHT: 64 IN

## 2025-07-16 DIAGNOSIS — Z79.890 MENOPAUSAL SYNDROME ON HORMONE REPLACEMENT THERAPY: Primary | ICD-10-CM

## 2025-07-16 DIAGNOSIS — R35.0 URINARY FREQUENCY: ICD-10-CM

## 2025-07-16 DIAGNOSIS — N95.1 MENOPAUSAL SYNDROME ON HORMONE REPLACEMENT THERAPY: Primary | ICD-10-CM

## 2025-07-16 PROCEDURE — 99214 OFFICE O/P EST MOD 30 MIN: CPT | Performed by: NURSE PRACTITIONER

## 2025-07-16 PROCEDURE — 3008F BODY MASS INDEX DOCD: CPT | Performed by: NURSE PRACTITIONER

## 2025-07-16 PROCEDURE — 1036F TOBACCO NON-USER: CPT | Performed by: NURSE PRACTITIONER

## 2025-07-16 RX ORDER — CONJUGATED ESTROGENS/BAZEDOXIFENE .45; 2 MG/1; MG/1
1 TABLET, FILM COATED ORAL DAILY
Qty: 90 TABLET | Refills: 3 | Status: SHIPPED | OUTPATIENT
Start: 2025-07-16

## 2025-07-16 ASSESSMENT — ENCOUNTER SYMPTOMS
CONSTITUTIONAL NEGATIVE: 0
ENDOCRINE NEGATIVE: 0
MUSCULOSKELETAL NEGATIVE: 0
CARDIOVASCULAR NEGATIVE: 0
RESPIRATORY NEGATIVE: 0
HEMATOLOGIC/LYMPHATIC NEGATIVE: 0
EYES NEGATIVE: 0
PSYCHIATRIC NEGATIVE: 0
NEUROLOGICAL NEGATIVE: 0
ALLERGIC/IMMUNOLOGIC NEGATIVE: 0
GASTROINTESTINAL NEGATIVE: 0

## 2025-07-16 ASSESSMENT — PAIN SCALES - GENERAL: PAINLEVEL_OUTOF10: 0-NO PAIN

## 2025-07-16 NOTE — PROGRESS NOTES
Subjective   Patient ID: Maria De Jesus Merino is a 44 y.o. female who presents for Menopause.  HPI   Today concerns:  - Took HT for 3-4 months then discontinued and is considering restarting. She would like to have a discussion about her options.   - Requesting a breast exam as specialist is recommending exam twice a year; she is currently getting [id number] screening   - May want a referral for pelvic floor PT     Works with functional medicine at [employer]  -  Started using CPAP machine and taking thyroid medication    Menopausal age: perimenopausal   Menses: Irregular periods, fluctuating between 28-32 days between cycles, periods lasting 4 days with breakthrough bleeding between cycles     Any Contraindications to HT: personal h/o breast cancer, estrogen sensitive cancer, dementia, stroke, MI, VTE or inherited high risk for VTE, SCAD: none  h/o congenital heart disease (increased risk of DVT) none     contraception: NFP and condoms, uncertain if she desires another pregnancy  HT: initially prescribed 8/2024: 0.5mg po estradiol and 200mg po MP 14 days/month   - Discontinued after 3-4 months     VMS: none  Sleep difficulties: yes, slightly improved  Mood changes: h/o SAD  Joint pain: yes  Brain fog/difficulty concentrating: yes, long history; feels she may have ADHD; worsening     GSM: improved  are you sexually active: yes  dyspareunia: recently improved, worse with condom use  decrease in desire: improved  difficulty reaching orgasm: none  Urinary Incontinence: frequency; hymen exposure d/t multiple vaginal births as discussed with GYN          Fractures: none  calcium intake: adequate; takes a multivitamin  Vitamin D intake: adequate; takes vitamin D supplement in the winter  FH osteoporosis: none    FH breast cancer: none  FH ovarian cancer: none  - FH uterine cancer: mother  FH colon cancer: none  FH pancreatic cancer: none    Review of Systems    Objective   Physical Exam  Chest:   Breasts:     Right: Normal.      Left:  Normal.         Assessment/Plan   Diagnoses and all orders for this visit:  Menopausal syndrome on hormone replacement therapy  -     conj estrogens-bazedoxifene (Duavee) 0.45-20 mg tablet; Take 1 capsule by mouth once daily.       She has not been able to tolerate the micronized progesterone: she has negative mood changes  We discussed that while she is having regular menses, I am ok with her using a 0.025mg estradiol patch, but any higher of a dose or when she starts to space out between cycles, we will need to add in a progestogen  We discussed Duavee as an option because it doesn't use a progestogen and may be protective at the breast. We discussed that we don't have a long term study on it's effectiveness with reducing breast cancer but from a physiological stand point it makes sense that it would be protective.     Referral to PFPT for urinary frequency        ABRAHAM Eagle-CNP 07/16/25 10:51 AM

## 2025-07-29 ENCOUNTER — TELEPHONE (OUTPATIENT)
Dept: OBSTETRICS AND GYNECOLOGY | Facility: CLINIC | Age: 45
End: 2025-07-29
Payer: COMMERCIAL

## 2025-07-29 NOTE — TELEPHONE ENCOUNTER
Contacted [facility]  Gave ref#[id number], pt's name and  to representative. Representative had additional question needed to submit prior auth. Representative submitted prior auth. No further questions.

## 2025-09-03 ENCOUNTER — HOSPITAL ENCOUNTER (OUTPATIENT)
Dept: RADIOLOGY | Facility: EXTERNAL LOCATION | Age: 45
Discharge: HOME | End: 2025-09-03

## 2025-09-17 ENCOUNTER — APPOINTMENT (OUTPATIENT)
Dept: RADIOLOGY | Facility: HOSPITAL | Age: 45
End: 2025-09-17
Payer: COMMERCIAL

## 2025-09-22 ENCOUNTER — APPOINTMENT (OUTPATIENT)
Dept: DERMATOLOGY | Facility: CLINIC | Age: 45
End: 2025-09-22
Payer: COMMERCIAL

## 2025-11-17 ENCOUNTER — APPOINTMENT (OUTPATIENT)
Dept: SLEEP MEDICINE | Facility: CLINIC | Age: 45
End: 2025-11-17
Payer: COMMERCIAL